# Patient Record
Sex: MALE | Race: WHITE | NOT HISPANIC OR LATINO
[De-identification: names, ages, dates, MRNs, and addresses within clinical notes are randomized per-mention and may not be internally consistent; named-entity substitution may affect disease eponyms.]

---

## 2017-03-16 PROBLEM — Z00.00 ENCOUNTER FOR PREVENTIVE HEALTH EXAMINATION: Status: ACTIVE | Noted: 2017-03-16

## 2017-03-28 ENCOUNTER — APPOINTMENT (OUTPATIENT)
Dept: SURGERY | Facility: CLINIC | Age: 32
End: 2017-03-28

## 2022-06-02 ENCOUNTER — EMERGENCY (EMERGENCY)
Facility: HOSPITAL | Age: 37
LOS: 0 days | Discharge: HOME | End: 2022-06-02
Attending: EMERGENCY MEDICINE | Admitting: EMERGENCY MEDICINE
Payer: COMMERCIAL

## 2022-06-02 VITALS
HEIGHT: 71 IN | HEART RATE: 100 BPM | RESPIRATION RATE: 20 BRPM | DIASTOLIC BLOOD PRESSURE: 84 MMHG | SYSTOLIC BLOOD PRESSURE: 136 MMHG | TEMPERATURE: 99 F | OXYGEN SATURATION: 97 % | WEIGHT: 199.96 LBS

## 2022-06-02 DIAGNOSIS — Z23 ENCOUNTER FOR IMMUNIZATION: ICD-10-CM
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
## 2022-06-02 DIAGNOSIS — S61.411A LACERATION WITHOUT FOREIGN BODY OF RIGHT HAND, INITIAL ENCOUNTER: ICD-10-CM
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
## 2022-06-02 PROCEDURE — 99283 EMERGENCY DEPT VISIT LOW MDM: CPT

## 2022-06-02 RX ORDER — TETANUS TOXOID, REDUCED DIPHTHERIA TOXOID AND ACELLULAR PERTUSSIS VACCINE, ADSORBED 5; 2.5; 8; 8; 2.5 [IU]/.5ML; [IU]/.5ML; UG/.5ML; UG/.5ML; UG/.5ML
0.5 SUSPENSION INTRAMUSCULAR ONCE
Refills: 0 | Status: COMPLETED | OUTPATIENT
Start: 2022-06-02 | End: 2022-06-02

## 2022-06-02 RX ADMIN — TETANUS TOXOID, REDUCED DIPHTHERIA TOXOID AND ACELLULAR PERTUSSIS VACCINE, ADSORBED 0.5 MILLILITER(S): 5; 2.5; 8; 8; 2.5 SUSPENSION INTRAMUSCULAR at 19:57

## 2022-06-02 NOTE — ED PROCEDURE NOTE - CPROC ED TOLERANCE1
Pt is still having some incontinence problems after his 11/7/18 surgery, he would like to know how long will this continue. Please give pt a call back thank you.
S/p simple prostatectomy 11/7/18  Post op visit 11/15/18  Patient has appt scheduled for 12/3/18. Called patient. Patient concerned with continued urinary incontinence. Incontinence occurs when he first wakes up in the morning and when bending over. Just a small amount of urine passes during incontinence episodes. It has improved just slightly since procedure, but still a concern of his. He denies having any fever, urinary urgency or pain with urination. Reports urinary frequency, but this is d/t his increased water intake. Informed patient that urinary incontinence not uncommon after prostatectomy. Patient asking how long incontinence generally lasts? He has a trip to the [place], which he is considering cancelling d/t incontinence. Will route to Dr. Zacarias Nieto' office to further advise.
Writer spoke with patient at this time and patient has intermittent urinary incontinence, patient is able to void with a steady stream. Patient has decreased water intake daily to about 32oz of water. No c/o constipation at this time. Patient offered an appointment with nurse to check bladder emptying at this time. Patient declined and will cancel trip to [facility]. Patient will keep appointment as schedule on Monday 12/3/2018. Patient to return call if problems occur with urinary stream with no stream and only leakage. All questions answered and patient verbalized understanding.
Patient tolerated procedure well.

## 2022-06-02 NOTE — ED PROVIDER NOTE - NS ED ROS FT
ROS:     constitutional: no fever, weight loss  msk: no joint pain or difficulty ROM  skin: +laceration of hand  neuro: no tingling , weakness , difficulty ambulation

## 2022-06-02 NOTE — ED PROVIDER NOTE - NS ED ATTENDING STATEMENT MOD
This was a shared visit with the BRE. I reviewed and verified the documentation and independently performed the documented:

## 2022-06-02 NOTE — ED PROVIDER NOTE - PATIENT PORTAL LINK FT
You can access the FollowMyHealth Patient Portal offered by [facility] by registering at the following website: [email or web address]. By joining [facility]’s FollowMyHealth portal, you will also be able to view your health information using other applications (apps) compatible with our system.

## 2022-06-02 NOTE — ED PROVIDER NOTE - PHYSICAL EXAMINATION
Vital Signs: I have reviewed the initial vital signs.  Constitutional: well-nourished, no acute distress  Musculoskeletal: good ROM of extremity,  no bony tenderness, no deformity, good peripheral pulses  Integumentary: (+)linear laceration 6 cm to hypothenar eminence right hand, good rom of digits, no foreign bodies appreciated. non tender to palpation   Neurologic: awake, alert, extremities’ motor and sensory functions grossly intact, no focal deficits  heme: (-) no adenopathy (-)lymphangitis

## 2022-06-02 NOTE — ED PROVIDER NOTE - ATTENDING APP SHARED VISIT CONTRIBUTION OF CARE
37 yo M presents with laceration to rt hand sustained on metal PTA.  Needs Tetanus. On exam pt in NAD AAO x 3, + 6 cm linear laceration to rt hypothenar eminence, no FB, good ROM, sensation intact

## 2022-06-02 NOTE — ED PROVIDER NOTE - OBJECTIVE STATEMENT
35 y/o male presents to the Ed with laceration to right hand on metal pta. patient with bleeding from wound. no tingling distally. no foreign body noted in wound. patient c/o throbbing pain

## 2022-06-02 NOTE — ED PROVIDER NOTE - CLINICAL SUMMARY MEDICAL DECISION MAKING FREE TEXT BOX
Tetanus updated. Wound care and repair. Pt instructed on proper wound care.  Will monitor area closely for signs of infection and will return if any redness, streaking, drainage, increased swelling, fevers or any other concerns.